# Patient Record
(demographics unavailable — no encounter records)

---

## 2024-10-07 NOTE — HISTORY OF PRESENT ILLNESS
[FreeTextEntry1] : Routine follow-up [de-identified] : Patient with history of hypertension, hyperlipidemia presents to office for routine blood pressure check.  During her last visit amlodipine 2.5 mg was initiated.  She is tolerating the medicine well without any dizziness, lightheadedness.

## 2024-11-14 NOTE — CARDIOLOGY SUMMARY
[___] : [unfilled] [No Ischemia] : no Ischemia [LVEF ___%] : LVEF [unfilled]% [None] : normal LV function [Normal] : normal LA size [Mild] : mild mitral regurgitation [de-identified] : NSR.  NO acute ST or T wave changes.  Old ASMI

## 2024-11-14 NOTE — DISCUSSION/SUMMARY
[FreeTextEntry1] : This is an 80 year old woman with a history of hypertension, hyperlipidemia, carotid artery disease, and peripheral vascular disease who presents to the office for follow up.  She is s/p left upper lobe wedge resection, and was diagnosed with adenocarcinoma.  Her margins were clear, and her lymph nodes were negative.  She is following with oncology.  She has a left lower lobe lesion that was also removed.    She is stable from a cardiac point of view.  She has no symptoms.  Her most recent LDL was at goal on her current dose of statin.   She will continue her current dose of rosuvastatin.  Her blood pressure is well controlled on her current dose of losartan and amlodipine when checked at outside office visits, and she will continue it.  She will continue a daily baby aspirin.  She is bruising, and may need to change to QOD aspirin. She had heavy coronary artery calcification on CT.  She has had stress testing that has been normal.  No new symptoms.  he follows with Dr. Escoto for her carotid disease. Her last echo showed normal LV function with no significant valvular disease.  She will continue to follow annually.  [EKG obtained to assist in diagnosis and management of assessed problem(s)] : EKG obtained to assist in diagnosis and management of assessed problem(s)

## 2024-11-14 NOTE — REASON FOR VISIT
[Follow-Up - Clinic] : a clinic follow-up of [Hyperlipidemia] : hyperlipidemia [Hypertension] : hypertension [Medication Management] : Medication management [Peripheral Vascular Disease] : peripheral vascular disease

## 2024-11-14 NOTE — PHYSICAL EXAM
[Normal Appearance] : normal appearance [General Appearance - In No Acute Distress] : no acute distress [Normal Conjunctiva] : the conjunctiva exhibited no abnormalities [Normal Oral Mucosa] : normal oral mucosa [Normal Jugular Venous V Waves Present] : normal jugular venous V waves present [Respiration, Rhythm And Depth] : normal respiratory rhythm and effort [Exaggerated Use Of Accessory Muscles For Inspiration] : no accessory muscle use [Auscultation Breath Sounds / Voice Sounds] : lungs were clear to auscultation bilaterally [Bowel Sounds] : normal bowel sounds [Abdomen Soft] : soft [Abdomen Tenderness] : non-tender [Abnormal Walk] : normal gait [Gait - Sufficient For Exercise Testing] : the gait was sufficient for exercise testing [Nail Clubbing] : no clubbing of the fingernails [Cyanosis, Localized] : no localized cyanosis [Petechial Hemorrhages (___cm)] : no petechial hemorrhages [Skin Color & Pigmentation] : normal skin color and pigmentation [] : no rash [No Venous Stasis] : no venous stasis [Skin Lesions] : no skin lesions [Oriented To Time, Place, And Person] : oriented to person, place, and time [Affect] : the affect was normal [Mood] : the mood was normal [No Anxiety] : not feeling anxious [Normal] : normal [No Precordial Heave] : no precordial heave was noted [Normal Rate] : normal [Rhythm Regular] : regular [Normal S1] : normal S1 [Normal S2] : normal S2 [No Gallop] : no gallop heard [II] : a grade 2 [1+] : left 1+ [No Abnormalities] : the abdominal aorta was not enlarged and no bruit was heard [No Pitting Edema] : no pitting edema present [FreeTextEntry1] : No JVD [Bruit] : no bruit heard

## 2024-11-14 NOTE — HISTORY OF PRESENT ILLNESS
[FreeTextEntry1] : This is an 80 year old woman with a history of hypertension, hyperlipidemia, and peripheral vascular disease and carotid artery disease who presents to the office for follow-up.  She was last seen in the office one year ago.  She has a history of lung adenocarcinoma, and is s/p left upper lobe wedge resection.  She has a left lower lobe nodule that is being watched with serial CT scans.   From a cardiac standpoint, she reports that she feels great.  She denies chest pain, increased dyspnea, PND, orthopnea, LE swelling, dizziness, lightheadedness, or syncope.  She takes all of her medications as prescribed, and is denying any adverse effects.  She reports that her blood pressure has been very well controlled as of late.   She has been taking her medications as prescribed.   She had recent lipid testing, and her LDL was at goal.  Her exercise tolerance continues stable.

## 2024-12-23 NOTE — HISTORY OF PRESENT ILLNESS
[FreeTextEntry8] : Patient presents to office for follow-up of possible left lower lobe pneumonia diagnosed at an urgent care center where patient presented on December 19 with a cough that was nonproductive.  She was prescribed Zithromax and Augmentin which she completed. She presents today for follow-up and denies any significant sputum, fever, chills, night sweats or chest pains or shortness of breath.

## 2025-01-07 NOTE — REVIEW OF SYSTEMS
[Negative] : Genitourinary [Shortness Of Breath] : no shortness of breath [Wheezing] : no wheezing [Dyspnea on Exertion] : no dyspnea on exertion

## 2025-01-07 NOTE — PHYSICAL EXAM
[No Acute Distress] : no acute distress [Normal Sclera/Conjunctiva] : normal sclera/conjunctiva [Normal Oropharynx] : the oropharynx was normal [Normal TMs] : both tympanic membranes were normal [No Lymphadenopathy] : no lymphadenopathy [Supple] : supple [Thyroid Normal, No Nodules] : the thyroid was normal and there were no nodules present [Normal] : soft, non-tender, non-distended, no masses palpated, no HSM and normal bowel sounds [Normal Supraclavicular Nodes] : no supraclavicular lymphadenopathy [Normal Posterior Cervical Nodes] : no posterior cervical lymphadenopathy [Normal Anterior Cervical Nodes] : no anterior cervical lymphadenopathy [No CVA Tenderness] : no CVA  tenderness [de-identified] : Few scattered rhonchi, no dullness, no wheeze

## 2025-01-07 NOTE — HISTORY OF PRESENT ILLNESS
[FreeTextEntry1] : Routine follow-up [de-identified] : Patient presents to office for routine follow-up following a left lower lobe pneumonia.  Recent chest x-ray shows persistent reticular nodular opacity in the left lower lobe.  Patient denies any fever, chills, night sweats, chest pain, or shortness of breath.  She does complain of some persistent sputum production that is clear especially when lying down.

## 2025-01-09 NOTE — HISTORY OF PRESENT ILLNESS
[FreeTextEntry1] : 79 yo woman referred for elevated creatinine Pt hx Htn, HLD, vascular disease, dementia,  Hx Lung cancer - stage1 Has lung nodule Recently told of elevated creatinine Main life concern seems to be memory issues  Recent fall- [facility] Started oxybutinin, dose up to 10 mg- incontinence better Has had recent upper respiratory infection treated with antibiotics and now on prednisone for a 10-day course

## 2025-01-09 NOTE — ASSESSMENT
[FreeTextEntry1] : 81 yo woman referred for elevated creatinine Pt hx Htn, HLD, vascular disease, dementia,  Hx Lung cancer - stage1 Has lung nodule Recently told of elevated creatinine Main life concern seems to be memory issues  Recent fall- [facility] Started oxybutinin, dose up to 10 mg- incontinence better Has had recent upper respiratory infection treated with antibiotics and now on prednisone for a 10-day course   --- CKD   Creat elevated on and off   Probably does have early kidney disease   Likely ischemic- vascular   SCr has been in a wide range- probably depending on hydration  Urine incontinence   Dec water in from 6 bottles to 2 1 liter at our first visit     Then w oxybutinin now at 10 mg Htn   Home BPs are reportedly normal    The total time of preparation for this visit, the visit itself and writing the note was 40 minutes

## 2025-01-09 NOTE — PHYSICAL EXAM
[General Appearance - Alert] : alert [Sclera] : the sclera and conjunctiva were normal [General Appearance - In No Acute Distress] : in no acute distress [Extraocular Movements] : extraocular movements were intact [PERRL With Normal Accommodation] : pupils were equal in size, round, and reactive to light [Outer Ear] : the ears and nose were normal in appearance [Oropharynx] : the oropharynx was normal [Neck Appearance] : the appearance of the neck was normal [Neck Cervical Mass (___cm)] : no neck mass was observed [Jugular Venous Distention Increased] : there was no jugular-venous distention [Thyroid Diffuse Enlargement] : the thyroid was not enlarged [Thyroid Nodule] : there were no palpable thyroid nodules [Auscultation Breath Sounds / Voice Sounds] : lungs were clear to auscultation bilaterally [Heart Rate And Rhythm] : heart rate was normal and rhythm regular [Heart Sounds] : normal S1 and S2 [Heart Sounds Gallop] : no gallops [Murmurs] : no murmurs [Heart Sounds Pericardial Friction Rub] : no pericardial rub [Full Pulse] : the pedal pulses are present [Edema] : there was no peripheral edema [Bowel Sounds] : normal bowel sounds [Abdomen Soft] : soft [Abdomen Tenderness] : non-tender [] : no hepato-splenomegaly [Abdomen Mass (___ Cm)] : no abdominal mass palpated

## 2025-01-27 NOTE — PHYSICAL EXAM
[Normal Mood and Affect] : normal mood and affect [Able to Communicate] : able to communicate [Well Developed] : well developed [Well Nourished] : well nourished [NL (30)] : right lateral bending 30 degrees [Extension] : extension [Facet arthropathy] : Facet arthropathy [Disc space narrowing] : Disc space narrowing [AP] : anteroposterior [There are no fractures, subluxations or dislocations. No significant abnormalities are seen] : There are no fractures, subluxations or dislocations. No significant abnormalities are seen [de-identified] : thin [] : negative sitting straight leg raise [FreeTextEntry1] : DDD T12-L1, L4-5.  DFD L4-S1. [TWNoteComboBox7] : forward flexion 75 degrees [de-identified] : extension 10 degrees

## 2025-01-27 NOTE — HISTORY OF PRESENT ILLNESS
[Lower back] : lower back [5] : 5 [Dull/Aching] : dull/aching [Sharp] : sharp [Intermittent] : intermittent [Heat] : heat [Retired] : Work status: retired [de-identified] : 1/27/25:  Initial visit for this 80 year old female complaining of spontaneous onset of rt sided LBP x last 6 days duration. No hx of trauma. Constant and daily. Worse with prolonged sitting. No wake up pain at night. Using a heating pad at home and a Salonpas patch. Tried Motrin prn w/ ? relief. LBP is a "5". Radiates towards rt knee.  PMH: No prior LB issues.  Hx dementia, COPD, lung cancer, CKD Stage II. [] : no [FreeTextEntry1] : right  hip [de-identified] : Salonpas [de-identified] : none

## 2025-02-03 NOTE — PLAN
[TextEntry] : We discussed at length with the patient the options for treatment.  We discussed conservative care including physical therapy, acupuncture, massage therapy and chiropractic care.  We discussed injection therapy and even surgical intervention should the patient fail conservative care.  We discussed, risks, benefits, complications, alternatives, outcomes and expectations.   All questions answered.  Pt will start PT  Will obtain an MRI L-spine  The patient was referred to a Pain Management Specialist for Chemical and Interventional Pain Management.

## 2025-02-03 NOTE — HISTORY OF PRESENT ILLNESS
[Lower back] : lower back [Dull/Aching] : dull/aching [5] : 5 [Sharp] : sharp [Intermittent] : intermittent [Retired] : Work status: retired [3] : 3 [Ice] : ice [de-identified] : 02/03/25:  Returns today still c/o rt sided LBP and radiating rt thigh pain. Finished MDP x 1 with temp relief only. Has not started PT yet.  1/27/25:  Initial visit for this 80 year old female complaining of spontaneous onset of rt sided LBP x last 6 days duration. No hx of trauma. Constant and daily. Worse with prolonged sitting. No wake up pain at night. Using a heating pad at home and a Salonpas patch. Tried Motrin prn w/ ? relief. LBP is a "5". Radiates towards rt knee.  PMH: No prior LB issues.  Hx dementia, COPD, lung cancer, CKD Stage II. [] : no [FreeTextEntry1] : right  hip [de-identified] : Salonpas [de-identified] : 1/27/25 [de-identified] : Dr Dickson [de-identified] : none

## 2025-02-06 NOTE — DISCUSSION/SUMMARY
[de-identified] : After discussing various treatment options with the patient including but not limited to oral medications, physical therapy, exercise modalities as well as interventional spinal injections, we have decided with the following plan:  - Continue Home exercises, stretching, activity modification, physical therapy, and conservative care. - MRI report and/or images was reviewed and discussed with the patient. - Recommend Right L4-5 Transforaminal Epidural Steroid Injection under fluoroscopic guidance with image. - The risks, benefits and alternatives of the proposed procedure were explained in detail with the patient. The risks outlined include but are not limited to infection, bleeding, post-dural puncture headache, nerve injury, a temporary increase in pain, failure to resolve symptoms, allergic reaction, symptom recurrence, and possible elevation of blood sugar in diabetics. All questions were answered to patient's apparent satisfaction and he/she verbalized an understanding. - Patient is presenting with acute/sub-acute radicular pain with impairment in ADLs and functionality.  The pain has not responded to conservative care including NSAID therapy and/or physical therapy.  There is no bleeding tendency, unstable medical condition, or systemic infection. - Follow up in 1-2 weeks post injection for re-evaluation. - Will provide prescription for Physical Therapy.

## 2025-02-06 NOTE — HISTORY OF PRESENT ILLNESS
[Lower back] : lower back [Right Leg] : right leg [Dull/Aching] : dull/aching [Radiating] : radiating [Sharp] : sharp [] : yes [Frequent] : frequent [Ice] : ice [Nothing helps with pain getting better] : Nothing helps with pain getting better [FreeTextEntry1] : Initial HPI 02/06/2025: Pain started a few weeks ago and is on the RIGHT side of the lower back and radiates into the right buttock and down the right anterior thigh to the knee. Saw Dr. Dickson who recommended PT and pain mgt.   MRI Lumbar Spine 2/3/25 independently reviewed: L4-5 spondy with right HNP and right L4 impingement with mod/severe NF stenosis  Conservative Care: Ice PRN  Pain Medications: Motrin PRN; completed MDP with temporary relief  Past Injections: none Spine surgery: none  Blood thinners: *pt takes 81mg Aspirin  PMHx: dementia, COPD, lung cancer, CKD Stage II. [FreeTextEntry7] : rt leg  [FreeTextEntry8] : getting out of bed  [de-identified] : getting out of bed

## 2025-02-06 NOTE — PHYSICAL EXAM
[de-identified] : Constitutional; Appears well, no apparent distress Ability to communicate: Normal  Respiratory: non-labored breathing Skin: No rash noted Head: Normocephalic, atraumatic Neck: no visible thyroid enlargement Eyes: Extraocular movements intact Neurologic: Alert and oriented x3 Psychiatric: normal mood, affect and behavior [] : light touch intact throughout both lower extremities

## 2025-02-12 NOTE — PHYSICAL EXAM
[Normal Sclera/Conjunctiva] : normal sclera/conjunctiva [PERRL] : pupils equal round and reactive to light [EOMI] : extraocular movements intact [Normal Outer Ear/Nose] : the outer ears and nose were normal in appearance [Normal Oropharynx] : the oropharynx was normal [No Lymphadenopathy] : no lymphadenopathy [Thyroid Normal, No Nodules] : the thyroid was normal and there were no nodules present [No Carotid Bruits] : no carotid bruits [No Varicosities] : no varicosities [No Edema] : there was no peripheral edema [No Palpable Aorta] : no palpable aorta [Normal Appearance] : normal in appearance [No Masses] : no palpable masses [No Nipple Discharge] : no nipple discharge [No Axillary Lymphadenopathy] : no axillary lymphadenopathy [Normal] : no posterior cervical lymphadenopathy and no anterior cervical lymphadenopathy [No CVA Tenderness] : no CVA  tenderness [No Spinal Tenderness] : no spinal tenderness [No Rash] : no rash [Coordination Grossly Intact] : coordination grossly intact [No Focal Deficits] : no focal deficits [Normal Gait] : normal gait [Normal Affect] : the affect was normal [Normal Insight/Judgement] : insight and judgment were intact [de-identified] : Positive abdominal bruit and bilateral femoral bruit

## 2025-02-12 NOTE — ASSESSMENT
[FreeTextEntry1] : Stable Continue present medication Check routine blood work Advise regular aerobic activity

## 2025-02-12 NOTE — HISTORY OF PRESENT ILLNESS
[de-identified] : Patient with history of hypertension, hyperlipidemia, coronary artery calcification, COPD prediabetes presents to office for annual wellness exam and routine follow-up. Overall she feels well and denies any exertional chest pain, shortness of breath, palpitations and states compliance with all medication.

## 2025-02-12 NOTE — DATA REVIEWED
[FreeTextEntry1] : EKG reveals normal sinus rhythm with nonspecific ST-T wave changes, no acute change from previous

## 2025-02-12 NOTE — HEALTH RISK ASSESSMENT
[Little interest or pleasure doing things] : 1) Little interest or pleasure doing things [Feeling down, depressed, or hopeless] : 2) Feeling down, depressed, or hopeless [0] : 2) Feeling down, depressed, or hopeless: Not at all (0) [PHQ-2 Negative - No further assessment needed] : PHQ-2 Negative - No further assessment needed [Patient reported mammogram was normal] : Patient reported mammogram was normal [Patient reported bone density results were abnormal] : Patient reported bone density results were abnormal [Patient reported colonoscopy was abnormal] : Patient reported colonoscopy was abnormal [[id number]] : 0 [MammogramDate] : 10/2024 [BoneDensityDate] : 10/2023 [BoneDensityComments] : Osteopenia [ColonoscopyDate] : 6/2024 [ColonoscopyComments] : Positive polyps, was advised follow-up in 3-year

## 2025-03-13 NOTE — DISCUSSION/SUMMARY
[de-identified] : After discussing various treatment options with the patient including but not limited to oral medications, physical therapy, exercise modalities as well as interventional spinal injections, we have decided with the following plan:  - Continue home exercises, stretching, activity modification, physical therapy, and conservative care. - Follow-up as needed. - Will provide note that she can return to the gym.

## 2025-03-13 NOTE — DISCUSSION/SUMMARY
[de-identified] : After discussing various treatment options with the patient including but not limited to oral medications, physical therapy, exercise modalities as well as interventional spinal injections, we have decided with the following plan:  - Continue home exercises, stretching, activity modification, physical therapy, and conservative care. - Follow-up as needed. - Will provide note that she can return to the gym.

## 2025-03-13 NOTE — PHYSICAL EXAM
[de-identified] : Constitutional; Appears well, no apparent distress Ability to communicate: Normal  Respiratory: non-labored breathing Skin: No rash noted Head: Normocephalic, atraumatic Neck: no visible thyroid enlargement Eyes: Extraocular movements intact Neurologic: Alert and oriented x3 Psychiatric: normal mood, affect and behavior [] : negative straight leg raise

## 2025-03-13 NOTE — PHYSICAL EXAM
[de-identified] : Constitutional; Appears well, no apparent distress Ability to communicate: Normal  Respiratory: non-labored breathing Skin: No rash noted Head: Normocephalic, atraumatic Neck: no visible thyroid enlargement Eyes: Extraocular movements intact Neurologic: Alert and oriented x3 Psychiatric: normal mood, affect and behavior [] : negative straight leg raise

## 2025-03-13 NOTE — HISTORY OF PRESENT ILLNESS
[Lower back] : lower back [Right Leg] : right leg [Dull/Aching] : dull/aching [Radiating] : radiating [Sharp] : sharp [Frequent] : frequent [Ice] : ice [Nothing helps with pain getting better] : Nothing helps with pain getting better [FreeTextEntry1] : 03/13/2025: s/p Right L4-5 LESI on 02/26/25 with 100% relief and improvement of ADLs. Has been feeling great since injection.   Initial HPI 02/06/2025: Pain started a few weeks ago and is on the RIGHT side of the lower back and radiates into the right buttock and down the right anterior thigh to the knee. Saw Dr. Dickson who recommended PT and pain mgt.   MRI Lumbar Spine 2/3/25 independently reviewed: L4-5 spondy with right HNP and right L4 impingement with mod/severe NF stenosis  Conservative Care: Ice PRN  Pain Medications: Motrin PRN; completed MDP with temporary relief  Past Injections: none Spine surgery: none  Blood thinners: *pt takes 81mg Aspirin  PMHx: dementia, COPD, lung cancer, CKD Stage II. [] : yes [FreeTextEntry7] : rt leg  [FreeTextEntry8] : getting out of bed  [de-identified] : getting out of bed  [TWNoteComboBox1] : 100%

## 2025-03-13 NOTE — HISTORY OF PRESENT ILLNESS
[Lower back] : lower back [Right Leg] : right leg [Dull/Aching] : dull/aching [Radiating] : radiating [Sharp] : sharp [Frequent] : frequent [Ice] : ice [Nothing helps with pain getting better] : Nothing helps with pain getting better [FreeTextEntry1] : 03/13/2025: s/p Right L4-5 LESI on 02/26/25 with 100% relief and improvement of ADLs. Has been feeling great since injection.   Initial HPI 02/06/2025: Pain started a few weeks ago and is on the RIGHT side of the lower back and radiates into the right buttock and down the right anterior thigh to the knee. Saw Dr. Dickson who recommended PT and pain mgt.   MRI Lumbar Spine 2/3/25 independently reviewed: L4-5 spondy with right HNP and right L4 impingement with mod/severe NF stenosis  Conservative Care: Ice PRN  Pain Medications: Motrin PRN; completed MDP with temporary relief  Past Injections: none Spine surgery: none  Blood thinners: *pt takes 81mg Aspirin  PMHx: dementia, COPD, lung cancer, CKD Stage II. [] : yes [FreeTextEntry7] : rt leg  [FreeTextEntry8] : getting out of bed  [de-identified] : getting out of bed  [TWNoteComboBox1] : 100%

## 2025-03-21 NOTE — PHYSICAL EXAM
[Normal Breath Sounds] : Normal breath sounds [Normal Heart Sounds] : normal heart sounds [Normal Rate and Rhythm] : normal rate and rhythm [Right Carotid Bruit] : right carotid bruit heard [2+] : left 2+ [Ankle Swelling Bilaterally] : bilaterally  [Purpura] : purpura [Alert] : alert [Oriented to Person] : oriented to person [Oriented to Place] : oriented to place [Calm] : calm [JVD] : no jugular venous distention  [Left Carotid Bruit] : no bruit heard over the left carotid [Ankle Swelling (On Exam)] : not present [Abdomen Masses] : No abdominal masses [Abdomen Tenderness] : ~T ~M No abdominal tenderness [de-identified] : She was in no distress

## 2025-03-21 NOTE — HISTORY OF PRESENT ILLNESS
[FreeTextEntry1] : She has some carotid artery stenosis for several years. This was initially noted after she was found to have a right carotid bruit. She has never had a stroke or TIA. She has some short-term memory loss. This is according to her , has not recently progressed. She has controlled hypertension and hyperlipidemia. She was also a previous smoker. She came back for another carotid duplex scan.

## 2025-03-21 NOTE — ASSESSMENT
[FreeTextEntry1] : Mrs. Angulo is a pleasant lady who has some mild carotid artery disease for several years. This is probably not the cause of her memory loss. Her memory loss appears to be unchanged. She was neurologically intact. She had normal carotid pulses. There was a 50-69% right ICA stenosis. The left carotid arteries were widely patent. She is stable. She and her  were reassured. She will return in 1-2 years.

## 2025-03-21 NOTE — PHYSICAL EXAM
[Normal Breath Sounds] : Normal breath sounds [Normal Heart Sounds] : normal heart sounds [Normal Rate and Rhythm] : normal rate and rhythm [Right Carotid Bruit] : right carotid bruit heard [2+] : left 2+ [Ankle Swelling Bilaterally] : bilaterally  [Alert] : alert [Purpura] : purpura [Oriented to Person] : oriented to person [Oriented to Place] : oriented to place [Calm] : calm [JVD] : no jugular venous distention  [Left Carotid Bruit] : no bruit heard over the left carotid [Ankle Swelling (On Exam)] : not present [Abdomen Masses] : No abdominal masses [Abdomen Tenderness] : ~T ~M No abdominal tenderness [de-identified] : She was in no distress

## 2025-03-21 NOTE — PHYSICAL EXAM
[Normal Breath Sounds] : Normal breath sounds [Normal Heart Sounds] : normal heart sounds [Normal Rate and Rhythm] : normal rate and rhythm [Right Carotid Bruit] : right carotid bruit heard [2+] : left 2+ [Ankle Swelling Bilaterally] : bilaterally  [Alert] : alert [Purpura] : purpura [Oriented to Person] : oriented to person [Oriented to Place] : oriented to place [Calm] : calm [JVD] : no jugular venous distention  [Left Carotid Bruit] : no bruit heard over the left carotid [Ankle Swelling (On Exam)] : not present [Abdomen Masses] : No abdominal masses [Abdomen Tenderness] : ~T ~M No abdominal tenderness [de-identified] : She was in no distress

## 2025-03-21 NOTE — PHYSICAL EXAM
[Normal Breath Sounds] : Normal breath sounds [Normal Heart Sounds] : normal heart sounds [Normal Rate and Rhythm] : normal rate and rhythm [Right Carotid Bruit] : right carotid bruit heard [2+] : left 2+ [Ankle Swelling Bilaterally] : bilaterally  [Purpura] : purpura [Alert] : alert [Oriented to Person] : oriented to person [Oriented to Place] : oriented to place [Calm] : calm [JVD] : no jugular venous distention  [Left Carotid Bruit] : no bruit heard over the left carotid [Ankle Swelling (On Exam)] : not present [Abdomen Masses] : No abdominal masses [Abdomen Tenderness] : ~T ~M No abdominal tenderness [de-identified] : She was in no distress

## 2025-03-21 NOTE — PHYSICAL EXAM
[Normal Breath Sounds] : Normal breath sounds [Normal Heart Sounds] : normal heart sounds [Normal Rate and Rhythm] : normal rate and rhythm [Right Carotid Bruit] : right carotid bruit heard [2+] : left 2+ [Ankle Swelling Bilaterally] : bilaterally  [Alert] : alert [Purpura] : purpura [Oriented to Person] : oriented to person [Oriented to Place] : oriented to place [Calm] : calm [JVD] : no jugular venous distention  [Left Carotid Bruit] : no bruit heard over the left carotid [Ankle Swelling (On Exam)] : not present [Abdomen Masses] : No abdominal masses [Abdomen Tenderness] : ~T ~M No abdominal tenderness [de-identified] : She was in no distress

## 2025-04-23 NOTE — PHYSICAL EXAM
[Normal Appearance] : normal appearance [Well Groomed] : well groomed [General Appearance - In No Acute Distress] : no acute distress [Edema] : no peripheral edema [Respiration, Rhythm And Depth] : normal respiratory rhythm and effort [Exaggerated Use Of Accessory Muscles For Inspiration] : no accessory muscle use [Abdomen Soft] : soft [Abdomen Tenderness] : non-tender [Costovertebral Angle Tenderness] : no ~M costovertebral angle tenderness [Urinary Bladder Findings] : the bladder was normal on palpation [Normal Station and Gait] : the gait and station were normal for the patient's age [] : no rash [No Focal Deficits] : no focal deficits [Oriented To Time, Place, And Person] : oriented to person, place, and time [Affect] : the affect was normal [Mood] : the mood was normal [No Palpable Adenopathy] : no palpable adenopathy [Chaperone Declined] : Chaperone offered however refused by patient,

## 2025-05-01 NOTE — PLAN
[TextEntry] : The patient is an 80-year-old female with a past medical history of multiple comorbidities including stage one lung cancer, who presented for evaluation of obstructive sleep apnea.  Obstructive Sleep Apnea: - The patient has mild AC as diagnosed by the sleep study, with an AHI of 13.5 events per hour. The clinical history and examination support these findings. The pathophysiology of AC was discussed with the patient. - Management plans discussed included PAP therapy. - Patient elected to proceed with PAP therapy, specifically CPAP at fixed settings. - Usage details include at least 4 hours every night, ideally the whole night while sleeping. The patient is to contact the DME company if there are any issues.  Follow-Up: - Follow-up in 3 months or sooner as needed to assess the effectiveness of CPAP therapy and adjust treatment as necessary.  The patient expressed understanding and agreement with the plan as outlined above and accepted responsibility for complying with any recommended testing, treatment, and follow-up visits. All relevant questions and concerns were addressed.   35 minutes were spent on the encounter. Relevant medical records were reviewed, including but not limited to outpatient, sleep center, hospital records, laboratory data, and diagnostic imaging studies. Greater than 50% of the face-to-face encounter time was spent on counseling and/or care coordination.

## 2025-05-01 NOTE — PHYSICAL EXAM
[TextEntry] : Vital signs reviewed. Constitutional: no acute distress, thin eldelrly woman HEENT: normal oropharynx, Mallampati Class: II Neck: normal appearance, no neck mass Cardiac: normal rate/rhythm, normal s1, s2 Pulmonary: no resp distress, clear to auscultation bilaterally, no r/r/w Chest: no abnormalities Extremities: no clubbing, no cyanosis, no edema Skin: normal color/ pigmentation Psychiatric: oriented x3, normal affect

## 2025-05-01 NOTE — HISTORY OF PRESENT ILLNESS
[FreeTextEntry1] : Ale Angulo presented to the clinic to discuss the results of her recent sleep study. She underwent a portable monitored sleep study on February 28, 2025, which indicated mild obstructive sleep apnea with an apnea-hypopnea index (AHI) of 13.5 events per hour and a lowest oxygen saturation of 89%. Ale reports no significant sleep-related symptoms such as difficulty falling asleep or excessive daytime sleepiness. Her sleep schedule typically involves going to bed between 10 and 11 PM and waking up around 9:30 to 10 AM. She feels ready to be active upon waking, especially on tao days.  Ale reports occasional snoring and infrequent morning headaches. Her  has noticed that she has been talking in her sleep, which has been occurring for approximately the last six months. There is no reported history of restless leg syndrome or parasomnias except for the talking during sleep.  Ale has a significant past medical history that includes atherosclerosis, chronic kidney disease, cognitive decline, hypertension, hyperlipidemia, pre-diabetes, and osteoarthritis. She also has a history of stage one lung cancer, for which she underwent two surgeries in 2016 and 2019 at [facility]. She follows up annually with CT scans, and there has been no progression of the cancer.  Sleep Schedule: - Time to bed: 10-11 PM - Wake up time: 9:30-10 AM - Nocturnal awakening: Occasional talking in sleep - Daytime sleepiness: Not reported  Relevant Medications: - Not specifically discussed in the transcript.  Past Medical History: - Atherosclerosis - Chronic kidney disease - Cognitive decline - Hypertension - Hyperlipidemia - Pre-diabetes - Osteoarthritis - Stage one lung cancer (surgically treated)  Social History: Ale has a history of smoking, having smoked for approximately 30 years before quitting in 2009. There are no other significant exposures mentioned.  Review of Systems: - General: Occasional morning headaches - Sleep: Occasional snoring, talking in sleep noted by  - Cognitive: Cognitive decline noted, possibly related to sleep apnea Rest of the review of systems were negative.  Relevant Data: - February 28, 2025: Portable monitored sleep study showed total monitoring time of 409 minutes, AHI of 13.5 events per hour, and lowest oxygen saturation of 89%.  Family History: - Two brothers with sleep apnea.

## 2025-05-13 NOTE — PHYSICAL EXAM
[General Appearance - Alert] : alert [General Appearance - In No Acute Distress] : in no acute distress [FreeTextEntry1] : flat affect  [Oriented To Time, Place, And Person] : oriented to person, place, and time [Affect] : the affect was normal [Person] : oriented to person [Place] : oriented to place [Time] : oriented to time [Short Term Intact] : short term memory impaired [Remote Intact] : remote memory impaired [Registration Intact] : recent registration memory intact [Span Intact] : the attention span was normal [Concentration Intact] : normal concentrating ability [Visual Intact] : visual attention was ~T not ~L decreased [Naming Objects] : no difficulty naming common objects [Repeating Phrases] : no difficulty repeating a phrase [Writing A Sentence] : no difficulty writing a sentence [Fluency] : fluency intact [Comprehension] : comprehension intact [Reading] : reading intact [Current Events] : adequate knowledge of current events [Past History] : inadequate knowledge of personal past history [Vocabulary] : adequate range of vocabulary [Total Score ___ / 30] : the patient achieved a score of [unfilled] /30 [Date / Time ___ / 5] : date / time [unfilled] / 5 [Place ___ / 5] : place [unfilled] / 5 [Registration ___ / 3] : registration [unfilled] / 3 [Serial Sevens ___/5] : serial sevens [unfilled] / 5 [Naming 2 Objects ___ / 2] : naming two objects [unfilled] / 2 [Repeating a Sentence ___ / 1] : repeating a sentence [unfilled] / 1 [Writing a Sentence ___ / 1] : write sentence [unfilled] / 1 [3-stage Verbal Command ___ / 3] : three-stage verbal command [unfilled] / 3 [Written Command ___ / 1] : written command [unfilled] / 1 [Copy a Design ___ / 1] : copy a design [unfilled] / 1 [Recall ___ / 3] : recall [unfilled] / 3 [Cranial Nerves Optic (II)] : visual acuity intact bilaterally,  visual fields full to confrontation, pupils equal round and reactive to light [Cranial Nerves Oculomotor (III)] : extraocular motion intact [Cranial Nerves Trigeminal (V)] : facial sensation intact symmetrically [Cranial Nerves Facial (VII)] : face symmetrical [Cranial Nerves Vestibulocochlear (VIII)] : hearing was intact bilaterally [Cranial Nerves Glossopharyngeal (IX)] : tongue and palate midline [Cranial Nerves Accessory (XI - Cranial And Spinal)] : head turning and shoulder shrug symmetric [Cranial Nerves Hypoglossal (XII)] : there was no tongue deviation with protrusion [Motor Tone] : muscle tone was normal in all four extremities [Motor Strength] : muscle strength was normal in all four extremities [Involuntary Movements] : no involuntary movements were seen [No Muscle Atrophy] : normal bulk in all four extremities [Motor Handedness Right-Handed] : the patient is right hand dominant [Motor Strength Upper Extremities Bilaterally] : strength was normal in both upper extremities [Motor Strength Lower Extremities Bilaterally] : strength was normal in both lower extremities [Sensation Tactile Decrease] : light touch was intact [Romberg's Sign] : a positive Romberg's sign was present [Limited Balance] : balance was intact [Past-pointing] : there was no past-pointing [Tremor] : no tremor present [Dysdiadochokinesia Bilaterally] : not present [2+] : Brachioradialis left 2+ [1+] : Ankle jerk left 1+ [Glabellar Reflex] : the glabellar reflex was present [FreeTextEntry4] : Mental Status Exam Presidents: 1/5 Alternating Pattern: ok, but tends to trace Spiral: ok Clock: 1/2 - missing numbers Repetition: ok R/L discrimination on self and examiner: ok Cross-line commands: ok Praxis:  -Motor: ok -Dynamic/Luria: ok -Ideomotor/Imitation: ok  -Ideational/writing/closing-in: ok -Dressing: ok [FreeTextEntry8] : normal stride w/ reduced left arm swing. Turn ok. Pull test ok  [Sclera] : the sclera and conjunctiva were normal [PERRL With Normal Accommodation] : pupils were equal in size, round, reactive to light, with normal accommodation [Extraocular Movements] : extraocular movements were intact [No APD] : no afferent pupillary defect [No KARAN] : no internuclear ophthalmoplegia [Full Visual Field] : full visual field [Outer Ear] : the ears and nose were normal in appearance [Neck Appearance] : the appearance of the neck was normal [Apical Impulse] : the apical impulse was normal [Edema] : there was no peripheral edema [] : no rash

## 2025-05-13 NOTE — HISTORY OF PRESENT ILLNESS
[FreeTextEntry1] : NO COVID. COVID VACCINE FULL.  2025: -recovered from SDH/SAH from 2024 -tends to fall at times, once by the stairs where she did not have a railing -appetite ok -sleep ok, some napping -STM: tends to forget a bit more, appts, events, some names -per daughter, she is using paraphrases more now, instead of names; some more confusion and confabulations; tends to repeat and ask same questions -other domains seem ok, but she does not venture out by herself.  2024: since last seen: -fall in the spring, SDH and SAH, resolved -fall and leaning over the summer, in the hot weather, spoke with NP Danielle, did EEG today-see report -orthostatic franklin @ PCP - WNL -appetite ok -sleeping ok   HPI interval 2024: 79-year-old female presents today for follow up with her spouse. Her son is on the phone. She had had improvement in memories and is remembering names since increasing Donepezil to 10mg. Son thinks her disposition has improved and she's not depressed but she has mistaken names and has used pronouns wrong. She has had instances where she forgot how to bread cutlets which she does all the time. He finds there has been a plataou of the decline. She is walking 1-2x weekly when used to walk 3-4x weekly. She started bilateral hearing aids since December. She is less engaged in conversations with family members. She is repeating things many times.  appetite: unchanged  sleep: good mood: good Adl and iadls: unchanged   HPI-Interval hx 2024: Since last seen: -MRI brain done 10/2023-->PT atrophy, CVD is moderate with some lacunes, no SWI lesions -labs with some HCY and MMA, maybe in relation to mild CRF -appetite ok, weight stable -sleep ok, seldom naps -motor ok, 3 falls recently - one from getting up to fast from couch, others accidental -STM seems a bit better with Donepezil, and she seems more social. Per family, she may be forgetting names a bit more. ADL ok. IADL with need for supervision and depends on family for transportation and shopping.   HPI: 79 YO RH woman accompanied by family presents with memory problems that first started about a year and half ago but has become more noticeable over this past summer. She will repeat herself and ask the same questions. She will forget recent events. She will call her grandchildren the wrong names.    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 her brother has parkinson's disease  -Memory: recent memory ok recent impaired -Speech :difficulty with articulation and word finding, No pausing. No difficulty with comprehension -Orientation: no episodes of getting lost; or confusion  -Praxis: no issues  -Decision making/Executive fx/Multitasking: no issues   -Sleep: on average sleeping 8 hours. No issues falling or staying asleep. Witnessed snoring. No seldom naps. No REM sleep behavior.   -Appetite: maintaining weight. No appetite loss   -Motor symptoms: No tremors, slow movements of falls.   -B/B:  no issue  -Psychiatric symptoms:  she feels happy most of the etime  -Functional status: Lyn Index of [place] in Activities of Daily Livin. Bathing/Showerin 2. Dressin 3. Toiletin 4. Transferrin 5. Continence: 1 6: Feedin  TOTAL: 6  Dewy [place] Instrumental Activities of Daily Living: A. Ability to Use Telephone: 1 B. Shoppin C. Food Preparation: 1 D. Housekeepin E. Laundry: 1 F. Transportation:  0 G. Responsibility for Own Medications: 1 she has a pill box  H: Ability to Handle Finances: n/a  TOTAL:  6  CDR: 0.5  -Professional status:  She worked as  at a bank. She is  with four children. Former use of tobacco for 30 years. Roseline lives with her . High school is her highest level of education. She cleans and reads, daily mass.  No daily exercise. Minimal brain stimulating activities.   PCP and other physicians: -PCP: Dr. Mike Sandoval  Workup done: MRI 10/23: Mild to moderate chronic microvascular changes.

## 2025-05-13 NOTE — DATA REVIEWED
[de-identified] :  EXAM: [id number] - MR BRAIN DEMENTIA W 3D#  - ORDERED BY:  MICHAEL VINSON   PROCEDURE DATE:  10/20/2023    INTERPRETATION:  CLINICAL INDICATIONS:  COMPARISON: None.  TECHNIQUE: MRI brain: Multiplanar, multisequence MR imaging of the brain are obtained without the administration of intravenous Gadavist contrast.  FINDINGS: There is no abnormal restricted diffusion to suggest acute infarction. Scattered periventricular and subcortical white matter T2 /FLAIR hyperintensities are seen without mass effect, nonspecific, likely representing mild to moderate chronic microvascular changes. Normal T2 flow-voids are seen within  the intracranial vasculature. The lateral ventricles and cortical sulci are age-appropriate in size and configuration. There is no mass, mass effect, or extra-axial fluid collection. There is no susceptibility artifact to suggest hemorrhage. Midline structures are normal.  The patient is status post bilateral ocular lens replacement surgery. Mild inflammatory mucosal changes are seen throughout the various portions of the paranasal sinuses. Small bilateral mastoid air cell effusions.  3D post-processing with segmentation and volumetric analysis were technically successful. General morphology and age-related atrophy reports were generated and archived in the PACS for referring physician use. Note, comparison to proprietary normative data is provided by the Neuro[employer] software and will depend on the calculated intracranial volume. The calculated intracranial volume (ICV) for this study was 1419 mL.  IMPRESSION: Mild to moderate chronic microvascular changes.  --- End of Report --- [de-identified] : 2024:  ECG: The heart rate on single channel ECG was predominantly between 50-60 BPM. EEG Classification / Summary: Abnormal EEG in the awake, drowsy and asleep states. 1. Background slowing, generalized

## 2025-05-13 NOTE — ASSESSMENT
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 MRI brain with AD-pattern atrophy and mild-moderate MVD with lacunes. Mild ET in hands. Apathy is present. Given recent fall with SDH/SAH she would not be ANTI-AMYLOID MONOCLONAL ANTIBODIES candidate for 12 months - 5/2025.  Pt has had some progression since last seen. Motor ok. Would not treat with Amyloid Targeting Therapies given progression.  Diagnostic Impression: -mild dementia - favor AD -apathy  Plan: -Aricept up to 23mg - will monitor appetite. I recommended to pursue mental and physical activity and to adhere to Mediterranean type of diet.

## 2025-05-13 NOTE — REVIEW OF SYSTEMS
[As Noted in HPI] : as noted in HPI [Confused or Disoriented] : confusion [Memory Lapses or Loss] : memory loss [Decr. Concentrating Ability] : no decrease in concentrating ability [Difficulty with Language] : ~M difficulty with language [Changed Thought Patterns] : no change in thought patterns [Repeating Questions] : repeated questioning about recent events [Facial Weakness] : no facial weakness [Arm Weakness] : no arm weakness [Hand Weakness] : no hand weakness [Leg Weakness] : no leg weakness [Poor Coordination] : poor coordination [Difficulty Writing] : no difficulty writing [Difficulties in Speech] : no speech difficulties [Numbness] : no numbness [Tingling] : no tingling [Abnormal Sensation] : no abnormal sensation [Hypersensitivity] : no hypersensitivity [Seizures] : no convulsions [Dizziness] : no dizziness [Fainting] : no fainting [Lightheadedness] : no lightheadedness [Vertigo] : no vertigo [Cluster Headache] : no cluster headache [Migraine Headache] : no migraine headache [Tension Headache] : no tension-type headache [Difficulty Walking] : no difficulty walking [Inability to Walk] : able to walk [Ataxia] : no ataxia [Frequent Falls] : not falling [Limping] : not limping [Negative] : Heme/Lymph

## 2025-07-03 NOTE — ASSESSMENT
[FreeTextEntry1] : 79 yo woman referred for elevated creatinine Pt hx Htn, HLD, vascular disease, dementia,  Hx Lung cancer - stage1 Has lung nodule Recently told of elevated creatinine Main life concern seems to be memory issues  Recent fall- outside on hot day On oxybutinin, dose up to 10 mg- incontinence still a problem --- CKD   Creat elevated on and off   Probably does have early kidney disease   Likely ischemic- vascular   SCr now back near normal, BP fine, urine clear  The total time of preparation for this visit, the visit itself and writing the note was 31 minutes

## 2025-07-03 NOTE — HISTORY OF PRESENT ILLNESS
[FreeTextEntry1] : 81 yo woman referred for elevated creatinine Pt hx Htn, HLD, vascular disease, dementia,  Hx Lung cancer - stage1 Has lung nodule Recently told of elevated creatinine Main life concern seems to be memory issues  Recent fall- outside on hot day On oxybutinin, dose up to 10 mg- incontinence still a problem